# Patient Record
Sex: FEMALE | Race: WHITE | NOT HISPANIC OR LATINO | Employment: FULL TIME | URBAN - METROPOLITAN AREA
[De-identification: names, ages, dates, MRNs, and addresses within clinical notes are randomized per-mention and may not be internally consistent; named-entity substitution may affect disease eponyms.]

---

## 2019-07-15 ENCOUNTER — OFFICE VISIT (OUTPATIENT)
Dept: URGENT CARE | Facility: CLINIC | Age: 29
End: 2019-07-15
Payer: COMMERCIAL

## 2019-07-15 VITALS
WEIGHT: 169 LBS | HEART RATE: 74 BPM | DIASTOLIC BLOOD PRESSURE: 70 MMHG | TEMPERATURE: 98 F | HEIGHT: 67 IN | RESPIRATION RATE: 16 BRPM | OXYGEN SATURATION: 100 % | BODY MASS INDEX: 26.53 KG/M2 | SYSTOLIC BLOOD PRESSURE: 100 MMHG

## 2019-07-15 DIAGNOSIS — L03.012 PARONYCHIA OF FINGER, LEFT: ICD-10-CM

## 2019-07-15 PROCEDURE — 99204 OFFICE O/P NEW MOD 45 MIN: CPT | Performed by: PHYSICIAN ASSISTANT

## 2019-07-15 RX ORDER — SULFAMETHOXAZOLE AND TRIMETHOPRIM 800; 160 MG/1; MG/1
1 TABLET ORAL 2 TIMES DAILY
Qty: 14 TAB | Refills: 0 | Status: SHIPPED | OUTPATIENT
Start: 2019-07-15 | End: 2019-07-22

## 2019-07-15 NOTE — PROGRESS NOTES
"Subjective:      Candy Irving is a 28 y.o. female who presents with Other (swelling,pain  when touched(L) finge-x3 days)          HPI   This is new problem.  The patient presents to clinic c/o tenderness and swelling to her left 4th finger near the nail fold that has gradually worsened over the past 3 days. The patient notes mild redness to the area. She denies drainage from the area and fever/chills. She also denies injury or trauma to her left 4th digit. The patient hasn't taken anything for her symptoms.       PMH:  has no past medical history on file.  MEDS: No current outpatient prescriptions on file.  ALLERGIES: No Known Allergies  SURGHX: No past surgical history on file.  SOCHX:  reports that she has never smoked. She has never used smokeless tobacco.  FH: Family history was reviewed, no pertinent findings to report      Review of Systems   Constitutional: Negative for fever.   HENT: Negative for congestion, ear pain and sore throat.    Eyes: Negative for discharge and redness.   Respiratory: Negative for cough and shortness of breath.    Cardiovascular: Negative for chest pain and leg swelling.   Gastrointestinal: Negative for abdominal pain, nausea and vomiting.   Genitourinary: Negative for dysuria.   Musculoskeletal: Negative for myalgias.   Skin: Negative for rash.   Neurological: Negative for headaches.   All other systems reviewed and are negative.         Objective:     /70 (BP Location: Right arm)   Pulse 74   Temp 36.7 °C (98 °F) (Temporal)   Resp 16   Ht 1.689 m (5' 6.5\")   Wt 76.7 kg (169 lb)   SpO2 100%   BMI 26.87 kg/m²      Physical Exam   Constitutional: She is oriented to person, place, and time. She appears well-developed and well-nourished. No distress.   HENT:   Head: Normocephalic and atraumatic.   Right Ear: External ear normal.   Left Ear: External ear normal.   Nose: Nose normal.   Eyes: Conjunctivae are normal.   Neck: Normal range of motion. Neck supple.   Cardiovascular: " Normal rate.    Pulmonary/Chest: Effort normal.   Musculoskeletal:        Hands:  Left 4th Digit:  An area of localized swelling to the ulnar aspect of the left 4th digit with associated erythema, consistent with a paronychia. Mild tenderness to palpation.  ROM intact  Neurovascular intact  Strength 5/5   Neurological: She is alert and oriented to person, place, and time.   Skin: Skin is warm and dry.           Progress:  Paronychia I&D:  Cleansed the area with alcohol. Used the bevel of an 18 gauge needle to unroof the skin near the nail fold. Purulent drainage expressed. The patient tolerated the procedure well.      Assessment/Plan:     1. Paronychia of finger, left  The patient's presenting symptoms and physical exam are consistent with a paronychia of her left fourth digit.  The patient's paronychia was drained today in clinic, see procedure note above.  Will prescribe the patient antibiotics at this time.  Recommend OTC medications and supportive care for symptomatic management.  Discussed return precautions with the patient, and she verbalized understanding.    - sulfamethoxazole-trimethoprim (BACTRIM DS) 800-160 MG tablet; Take 1 Tab by mouth 2 times a day for 7 days.  Dispense: 14 Tab; Refill: 0    OTC Tylenol or Motrin for fever/discomfort.  Warm Epson salt soaks  Keep area clean and dry  Follow-up with PCP as needed  Return to clinic or go to the ED if symptoms worsen or fail to improve, or if the patient should develop worsening/increasing pain/tenderness to her left fourth, increased swelling, increased redness or warmth to the affected area, decreased range of motion, fever/chills, and/or any concerning symptoms.    Discussed plan with the patient, and she agrees to the above.

## 2019-07-16 ASSESSMENT — ENCOUNTER SYMPTOMS
MYALGIAS: 0
ABDOMINAL PAIN: 0
NAUSEA: 0
EYE DISCHARGE: 0
COUGH: 0
EYE REDNESS: 0
VOMITING: 0
SHORTNESS OF BREATH: 0
HEADACHES: 0
FEVER: 0
SORE THROAT: 0